# Patient Record
Sex: MALE | Race: WHITE | URBAN - METROPOLITAN AREA
[De-identification: names, ages, dates, MRNs, and addresses within clinical notes are randomized per-mention and may not be internally consistent; named-entity substitution may affect disease eponyms.]

---

## 2017-08-12 ENCOUNTER — EMERGENCY (EMERGENCY)
Facility: HOSPITAL | Age: 52
LOS: 1 days | Discharge: HOME | End: 2017-08-12
Attending: EMERGENCY MEDICINE
Payer: MEDICAID

## 2017-08-12 VITALS
SYSTOLIC BLOOD PRESSURE: 135 MMHG | OXYGEN SATURATION: 97 % | RESPIRATION RATE: 16 BRPM | TEMPERATURE: 99 F | DIASTOLIC BLOOD PRESSURE: 78 MMHG | HEART RATE: 96 BPM | HEIGHT: 72 IN | WEIGHT: 251.99 LBS

## 2017-08-12 DIAGNOSIS — Z90.49 ACQUIRED ABSENCE OF OTHER SPECIFIED PARTS OF DIGESTIVE TRACT: Chronic | ICD-10-CM

## 2017-08-12 DIAGNOSIS — Z98.890 OTHER SPECIFIED POSTPROCEDURAL STATES: Chronic | ICD-10-CM

## 2017-08-12 PROCEDURE — 12001 RPR S/N/AX/GEN/TRNK 2.5CM/<: CPT

## 2017-08-12 PROCEDURE — 99282 EMERGENCY DEPT VISIT SF MDM: CPT | Mod: 25

## 2017-08-12 NOTE — ED STATDOCS - OBJECTIVE STATEMENT
50 y/o M w/ PMHx of blind R eye presents to the ED c/o L pinky lacerations s/p cutting it with a razor today. Pt is R handed and he received his tetanus within the last 10 years. Pt denies LOC, HA or any other complaints at this time. This patient is a 52 y/o right hand dominant man who presents to the ER c/o laceration to left 5th digit.  Patient states that accidently cut the finger on a razor.   Tetanus: up to date

## 2017-08-12 NOTE — ED STATDOCS - PROGRESS NOTE DETAILS
patient re-evaluated c/o left 5th digit laceration with razor x 4 hours ago, was searching in tool box when razor lacerated skin.  He reports is UTD with tetanus.  PE: +FROM, motor and sensory sensation intact, strength 5/5, adduction and abduction intact, cap refill < 2sec, +1cm laceration along mid phalaynx dorsal aspect, no visible tendon, radial pulse intact.  Plan irrigate, suture and have f/u with PCP for suture removal in 7-10 days.

## 2017-08-12 NOTE — ED ADULT NURSE NOTE - OBJECTIVE STATEMENT
Patient presents to ED with laceration to left pinky finger, bleeding controlled, denies any sort of medical hx, able to move extremity, VSS, A/Ox3, denies chest pain or sob.  Respirations are even and unlabored, lungs cta, +bowel x4 quads, abdomen soft, nontender/nondistended, skin w/d/i.

## 2017-08-12 NOTE — ED STATDOCS - ATTENDING CONTRIBUTION TO CARE
I, Jacqui Melendrez, performed the initial face to face bedside interview with this patient regarding history of present illness, review of symptoms and relevant past medical, social and family history.  I completed an independent physical examination.  I was the initial provider who evaluated this patient.  The history, relevant review of systems, past medical and surgical history, medical decision making, and physical examination was documented by the scribe in my presence and I attest to the accuracy of the documentation.  I have signed out the follow up of any pending tests (i.e. labs, radiological studies) to the ACP.  I have communicated the patient’s plan of care and disposition with the ACP.

## 2017-08-16 NOTE — ED PROCEDURE NOTE - NS ED PERI VASCULAR NEG
no cyanosis of extremity/fingers/toes warm to touch/capillary refill time < 2 seconds/no swelling/no paresthesia

## 2017-08-16 NOTE — ED PROCEDURE NOTE - CPROC ED POST PROC CARE GUIDE1
Keep the cast/splint/dressing clean and dry./Instructed patient/caregiver to follow-up with primary care physician./Verbal/written post procedure instructions were given to patient/caregiver./Elevate the injured extremity as instructed./Instructed patient/caregiver regarding signs and symptoms of infection.
Instructed patient/caregiver to follow-up with primary care physician./Instructed patient/caregiver regarding signs and symptoms of infection./Verbal/written post procedure instructions were given to patient/caregiver./Keep the cast/splint/dressing clean and dry.

## 2017-12-21 ENCOUNTER — EMERGENCY (EMERGENCY)
Facility: HOSPITAL | Age: 52
LOS: 1 days | Discharge: HOME | End: 2017-12-21
Attending: EMERGENCY MEDICINE
Payer: MEDICAID

## 2017-12-21 VITALS
RESPIRATION RATE: 16 BRPM | HEART RATE: 102 BPM | HEIGHT: 72 IN | WEIGHT: 251.11 LBS | DIASTOLIC BLOOD PRESSURE: 100 MMHG | SYSTOLIC BLOOD PRESSURE: 153 MMHG | OXYGEN SATURATION: 98 % | TEMPERATURE: 97 F

## 2017-12-21 DIAGNOSIS — Z90.49 ACQUIRED ABSENCE OF OTHER SPECIFIED PARTS OF DIGESTIVE TRACT: Chronic | ICD-10-CM

## 2017-12-21 DIAGNOSIS — Z98.890 OTHER SPECIFIED POSTPROCEDURAL STATES: Chronic | ICD-10-CM

## 2017-12-21 PROCEDURE — 10060 I&D ABSCESS SIMPLE/SINGLE: CPT | Mod: F7

## 2017-12-21 PROCEDURE — 99283 EMERGENCY DEPT VISIT LOW MDM: CPT | Mod: 25

## 2017-12-21 PROCEDURE — 10060 I&D ABSCESS SIMPLE/SINGLE: CPT

## 2017-12-21 NOTE — ED STATDOCS - ATTENDING CONTRIBUTION TO CARE
I, Anand Keenan, performed the initial face to face bedside interview with this patient regarding history of present illness, review of symptoms and relevant past medical, social and family history.  I completed an independent physical examination.  I was the initial provider who evaluated this patient. I have signed out the follow up of any pending tests (i.e. labs, radiological studies) to the ACP.  I have communicated the patient’s plan of care and disposition with the ACP.

## 2017-12-21 NOTE — ED ADULT TRIAGE NOTE - CHIEF COMPLAINT QUOTE
pt presents to ED with b/l hand tremors and swelling/pain/redness to right thrid digit./ pt states he bit his cuticle a few days and ago and tried to drain it yesterday, afebrile

## 2017-12-21 NOTE — ED STATDOCS - CARE PLAN
Principal Discharge DX:	Paronychia of finger of right hand  Instructions for follow-up, activity and diet:	warm soaks as discussed

## 2017-12-21 NOTE — ED STATDOCS - PROGRESS NOTE DETAILS
Care assumed from Dr. Keenan. Patient is pending I&D of paronychia. I &D performed and Pt tolerated well. Pt states that he is borderline DM . Pt states that he usually bites his finger. Augment sent to pharmacy x 5 days. F/U with PCP as discussed.

## 2017-12-21 NOTE — ED STATDOCS - SKIN, MLM
dorsal right medial right finger paranoica dorsal right hand medial right finger paranoica dorsal right medial 3rd finger paronichia

## 2017-12-21 NOTE — ED STATDOCS - MEDICAL DECISION MAKING DETAILS
Michele - Will send to [facility] for I&D. Will instruct to do warm soaks. Michele [facility] and instruct pt to do warm soaks.

## 2017-12-21 NOTE — ED STATDOCS - TOBACCO USE
10/13/17        Toñito S Drew  [address] [facility] [address]    Dear Toñito,    Component      Latest Ref Rng & Units 10/13/2017   Albumin      3.6 - 5.1 g/dL 4.0   TOTAL BILIRUBIN      0.2 - 1.0 mg/dL 0.2   DIRECT BILIRUBIN      0.0 - 0.2 mg/dL 0.1   ALK PHOSPHATASE      45 - 117 Units/L 88   ALT/SGPT      <79 Units/L 31   AST/SGOT      <38 Units/L 20   TOTAL PROTEIN      6.4 - 8.2 g/dL 7.3   FASTING STATUS      hrs 12   CHOLESTEROL      <200 mg/dL 200 (H)   CALCULATED LDL      <130 mg/dL 131 (H)   HDL      >39 mg/dL 51   TRIGLYCERIDE      <150 mg/dL 88   CALCULATED NON HDL      mg/dL 149   CHOL/HDL      <4.5 3.9   Fasting Status      hrs 12   Glucose      65 - 99 mg/dL 107 (H)           Upon review of your test results done recently, please take note of the following recommendations in the boxes ( x ) below:      Recommendations:   Please call me at your earliest convenience to discuss these results.      Treatment is required.  Please call the clinic and set up an appointment with me as soon as possible.    The test results are satisfactory.    Continue efforts at a low cholesterol, low fat diet.    HDL is low: exercise, weight loss, and discontinuing smoking can help increase this.    LDL is high: can lower with diet, weight loss.    Triglycerides are high: can lower with diet, weight loss, and lower alcohol intake.    Diabetes care is in (    ) excellent control, (    ) good control, (   ) needs improvement.   x   The glucose test demonstrated a blood sugar of 107.  A normal fasting blood sugar should be less than 100.  This elevated blood sugar is not in the diabetic range at this time.  However, it is important for your overall health to control your blood sugar to avoid diabetes and it's complications.  Therefore, I would recommend following a mediterarrean diet and avoiding alcohol. I would recommend repeating the fasting blood sugar and Hemoglobin A1C in 6 months.  Please contact the  scheduling department at [phone] for assistance in scheduling your appointment(s).           Repeat (   ) fasting lipid panel, (   ) fasting diabetes test,  (   ) Pap smear,         (   ) ________________________, in (   ) weeks, (   ) months, (   ) as soon as possible.    Make an appointment for follow - up in (   ) weeks, (   ) months, (   ) yearly check-up.    Other recommendations:        Sincerely,                  Dr. Kevin Pena  [address] [address]  [phone]      Mediterranean Diet      This guide has been prepared for your use by registered dietitians. If you have questions or concerns, please call the nearest [facility] facility to contact a dietitian. Diet counseling is available to discuss your specific needs.    What is the Mediterranean Diet?    The Mediterranean region includes three continents and more than 15 countries. People from [place], [place], [place], [place] and other Mediterranean  countries traditionally eat a diet consisting mainly of grains, legumes, fruits, vegetables, nuts, seeds and olive oil.  Key components of the Mediterranean Diet include:  • Eating foods primarily from plant sources, such asfruits and vegetables, whole grains, legumes, nutsand seeds  • A variety of minimally processed and, wherever possible, seasonally fresh and locally grown foods  • Replacing butter and margarine with healthy fats,such as olive oil  • Using herbs and spices instead of salt to flavor foods  • Eating fish and poultry at least twice weekly  • Daily consumption of low to moderate amounts of cheese and yogurt  • No more than four eggs per week (including those used in cooking and baking)  • Fresh fruit as the typical daily dessert; sweets with a significant amount of sugar and saturated fat consumed no more than a few times a month  • Red meat is only eaten a few times per month in small portions  • Moderate consumption of wine, normally with  meals; about one to two glasses per day for men and one glass per day for women, however, individuals should only drink wine if they are medically able to do so, and should ask their doctors for more information.  • Sharing healthy meals as a celebration with family and friends  • Regular physical activity at a level that promotes a healthy weight, fitness and well-being    Why should I follow the Mediterranean Diet?    Researchers have found that a higher intake of monounsaturated and polyunsaturated fat and lower intake of saturated fat and trans-fatty acids is associated with a decreased risk of heart disease and cancer.  • Saturated fat tends to be hard fat, such as the fat in red meat, butter, cheese, whole milk and ice cream.  • Trans-fatty acids are formed when vegetable oils are hardened or hydrogenated to form margarine or shortening.  • Monounsaturated and polyunsaturated fats are liquid at room temperature and are found in plant sources. The fat in olive oil is monounsaturated.    What is the Mediterranean Diet Pyramid?    The Mediterranean Diet Pyramid emphasizes making the base foundation of your meals grains (mostly whole), fruits, vegetables, legumes, nuts, herbs, spices and olive oil. Fish, poultry, eggs, cheese and yogurt are consumed regularly but in low to moderate amounts. Red meat and sweets are listed at the top of the pyramid meaning they should be eaten only a few times a month. The Mediterranean Diet also recommends drinking adequate amounts of water each day and the use of red wine in moderation.    Is the Mediterranean Diet good for everyone?    The pyramid describes a diet for most healthy adults. If overweight or obese, focus should then be on eating more grains, fruits, vegetables and legumes, and less meat, sweets and total fat. Some type of physical activity, such as walking or stair climbing, should be included every day. [id number] (04/12) [facility] Nutrition > Special Diets  Mediterranean  Diet,    Tips for Following the Mediterranean Diet    Bread, potatoes, pasta, rice and other cereals  • Build your diet from a base of grains - rice, pasta, couscous  • Learn to cook couscous, bulgur, barley, polenta and other interesting grains  • Add legumes - chickpeas, lentils, peanuts, beans and peas - to your meals  • Choose whole-grain varieties most often for extra fiber.    Fruits and vegetables  • Choose a wide variety of fruits and vegetables, and aim for 5 to 9 cups daily  • Top pasta, rice or even pizza with steamed or fresh vegetables  • Add popular Mediterranean ingredients like garlic, fresh herbs, chopped onions, darion and grated lemon or orange zest to dishes  • Try baking garlic until it is soft and ambrose. It adds a creamy, rich flavor to pasta, rice and bean dishes.  • Make fresh fruit your daily dessert     Fish, poultry, eggs and meat  • Limit fatty meat - it is high in saturated fat  • Choose lower fat alternatives such as poultry, fish or lean red meat  • Eat low to moderate amounts of fish, poultry and eggs a few times a week  • Eat fish, including oily fish such as mackerel, herring, tuna and salmon at least three times a week  • Limit red meat to only a few times per month; slice it in stir-fried dishes so it goes a long way    Milk and dairy products  • Eat low to moderate amounts of cheese and yogurt every day  • Choose lower fat varieties such as low fat yogurt and skim milk  • Use a sprinkling or thin shaving of sharp or imported cheese rather than eating more of less flavorful cheese    Nuts and seeds  • Choose a variety of nuts and seeds - almonds, hazelnuts, pistachios, walnuts and sesame seeds (1 ounce daily)  • Add a small amount of chopped nuts to salads, pasta or other grain dishes for a crunchy texture    Fats  • Substitute olive oil, rich in monounsaturated fat, for butter and other animal fats, which are high in saturated fats (3 tablespoons of olive oil mixed in food  daily).  • Use olive oil for salad dressings, marinades, sautéing and stir-frying meats, fish and vegetables  • Limit foods that are fried to only a few times a month  • Drizzle small amounts of olive oil on bread, rice and pastas    Sweets  • Eat only small amounts of foods that contain large amounts of sugar and saturated fat, such as cakes and pastries; limit these foods to only a few times a month    Wine  • Drink red wine in moderation - one to two 5-ounce glasses per day for men, one 5-ounce glass for women  • Avoid wine if it would put you or others at risk, including during pregnancy and before driving    Recommended Web sites for Mediterranean Recipes; Allrecipes.com, www.epicurious.com (Search for “Mediterranean” on each site)  A registered dietitian can help, Diet counseling with a registered dietitian may include information on:  • Label reading, shopping, food preparation, adjusting recipes  • Dining out  Tips for Following the Mediterranean Diet  For a list of [place] facilities with a dietitian, please call [facility] toll free at [phone] [phone] (04/12) [facility] Nutrition > Special Diets  The information presented is intended for general information and educational purposes. It is not intended to replace the advice of your health care provider. Contact your health care provider if you believe you have a health problem.  [facility] is a not-for-profit health care provider and a national leader in efforts to improve the quality of health care.                            Unknown if ever smoked

## 2017-12-21 NOTE — ED STATDOCS - NS ED ROS FT
Pt denies headache.  Pt denies throat pain.  Pt denies earache.  Pt denies neck pain.  Pt denies chest pain.  Pt denies abdominal pain.   Pt denies back pain.   Pt denies joint pain.  Pt denies muscle aches.   Pt denies any rash, fever, or chills. Pt denies headache.  Pt denies throat pain.  Pt denies earache.  Pt denies neck pain.  Pt denies chest pain.  Pt denies abdominal pain.   Pt denies back pain.   Pt denies any fever, or chills.

## 2017-12-21 NOTE — ED STATDOCS - OBJECTIVE STATEMENT
51 y/o M present to the ED c/o right 3rd digit pain. Pt states pain was relieved when he dipped his finger in hot water. He states he does not bite his nails and he never had this problem before. NKDA. No further complaints at this time. 51 y/o M present to the ED c/o right 3rd digit pain. Pt states pain was relieved when he dipped his finger in hot water. He states he bites his nails and he never had this problem before. NKDA. No further complaints at this time.

## 2017-12-21 NOTE — ED ADULT NURSE NOTE - OBJECTIVE STATEMENT
pt arrived with swelling to right third finger, pt denies any injury pt with reddness and swelling good pulses

## 2018-01-16 NOTE — ED PROCEDURE NOTE - ATTENDING CONTRIBUTION TO CARE
I, Faith Contreras, independently evaluated the patient and discussed the procedure with the PA. I evaluted the patient prior to and after the procedure and the patient tolerated the procedure well. I discussed indications to return to the ED and the importance of proper follow up and patient verbalizes understanding.

## 2021-05-26 ENCOUNTER — APPOINTMENT (OUTPATIENT)
Dept: PULMONOLOGY | Facility: CLINIC | Age: 56
End: 2021-05-26
Payer: COMMERCIAL

## 2021-05-26 ENCOUNTER — APPOINTMENT (OUTPATIENT)
Dept: DISASTER EMERGENCY | Facility: OTHER | Age: 56
End: 2021-05-26
Payer: COMMERCIAL

## 2021-05-26 VITALS
WEIGHT: 264 LBS | SYSTOLIC BLOOD PRESSURE: 124 MMHG | HEART RATE: 78 BPM | OXYGEN SATURATION: 97 % | TEMPERATURE: 97 F | RESPIRATION RATE: 16 BRPM | DIASTOLIC BLOOD PRESSURE: 80 MMHG

## 2021-05-26 DIAGNOSIS — G47.33 OBSTRUCTIVE SLEEP APNEA (ADULT) (PEDIATRIC): ICD-10-CM

## 2021-05-26 DIAGNOSIS — E66.9 OBESITY, UNSPECIFIED: ICD-10-CM

## 2021-05-26 DIAGNOSIS — Z86.39 PERSONAL HISTORY OF OTHER ENDOCRINE, NUTRITIONAL AND METABOLIC DISEASE: ICD-10-CM

## 2021-05-26 PROBLEM — H54.41 BLINDNESS, RIGHT EYE, NORMAL VISION LEFT EYE: Chronic | Status: ACTIVE | Noted: 2017-08-12

## 2021-05-26 PROBLEM — Z00.00 ENCOUNTER FOR PREVENTIVE HEALTH EXAMINATION: Noted: 2021-05-26

## 2021-05-26 PROCEDURE — 99204 OFFICE O/P NEW MOD 45 MIN: CPT

## 2021-05-26 PROCEDURE — 99072 ADDL SUPL MATRL&STAF TM PHE: CPT

## 2021-05-26 PROCEDURE — 0011A: CPT

## 2021-05-26 RX ORDER — SIMVASTATIN 20 MG/1
20 TABLET, FILM COATED ORAL
Refills: 0 | Status: ACTIVE | COMMUNITY

## 2021-05-26 RX ORDER — METFORMIN HYDROCHLORIDE 500 MG/1
500 TABLET, FILM COATED, EXTENDED RELEASE ORAL
Refills: 0 | Status: ACTIVE | COMMUNITY

## 2021-05-26 NOTE — PHYSICAL EXAM
[Low Lying Soft Palate] : low lying soft palate [Enlarged Base of the Tongue] : enlarged base of the tongue [III] : Mallampati Class: III [Neck Circumference: ___] : neck circumference: [unfilled] [Normal Rate/Rhythm] : normal rate/rhythm [No Resp Distress] : no resp distress [Clear to Auscultation Bilaterally] : clear to auscultation bilaterally [No Abnormalities] : no abnormalities [Benign] : benign [Normal Gait] : normal gait [No Clubbing] : no clubbing [No Cyanosis] : no cyanosis [No Edema] : no edema [FROM] : FROM [Normal Color/ Pigmentation] : normal color/ pigmentation [No Focal Deficits] : no focal deficits [Oriented x3] : oriented x3 [Normal Affect] : normal affect [TextBox_2] : obese [TextBox_11] : Patch over Rt Eye

## 2021-05-26 NOTE — ASSESSMENT
[FreeTextEntry1] : Patient likely with significant obstructive sleep apnea.I discussed the pathophysiology of Obstructive Sleep Apnea with the patient, and its association with the patient's symptoms of sleepiness and comorbidities of diabetes.  A home sleep study has been ordered.  The nature of that study was explained to the patient.  Followup will occur after the sleep study.\par The patient was warned not to drive while somnolent.

## 2021-05-26 NOTE — HISTORY OF PRESENT ILLNESS
[TextBox_4] : The patient is a 55-year-old male who comes for sleep evaluation. About 15 years ago he had a sleep study that showed sleep apnea. He was given CPAP he used it for a while. He had some trouble getting used to it. He lost about 30 pounds and felt better and stopped using CPAP. More recently he's gained weight again and he is complaining of significant excessive daytime sleepiness. Loud snoring and apneas are noted by his family. Generally he gets into bed at 1 AM and falls asleep within a half-hour. His sleep is restless with tossing and turning and he wakes up at 7 AM feeling unrefreshed. 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 He has not had any accidents. [ESS] : 11

## 2021-06-25 ENCOUNTER — APPOINTMENT (OUTPATIENT)
Dept: DISASTER EMERGENCY | Facility: OTHER | Age: 56
End: 2021-06-25
Payer: COMMERCIAL

## 2021-06-25 PROCEDURE — 0012A: CPT

## 2021-11-19 ENCOUNTER — EMERGENCY (EMERGENCY)
Facility: HOSPITAL | Age: 56
LOS: 0 days | Discharge: HOME | End: 2021-11-19
Attending: EMERGENCY MEDICINE
Payer: COMMERCIAL

## 2021-11-19 VITALS
OXYGEN SATURATION: 98 % | RESPIRATION RATE: 18 BRPM | HEART RATE: 98 BPM | WEIGHT: 197.98 LBS | SYSTOLIC BLOOD PRESSURE: 129 MMHG | DIASTOLIC BLOOD PRESSURE: 86 MMHG | TEMPERATURE: 98 F | HEIGHT: 73 IN

## 2021-11-19 DIAGNOSIS — Z72.89 OTHER PROBLEMS RELATED TO LIFESTYLE: ICD-10-CM

## 2021-11-19 DIAGNOSIS — Z90.49 ACQUIRED ABSENCE OF OTHER SPECIFIED PARTS OF DIGESTIVE TRACT: Chronic | ICD-10-CM

## 2021-11-19 DIAGNOSIS — Z98.890 OTHER SPECIFIED POSTPROCEDURAL STATES: Chronic | ICD-10-CM

## 2021-11-19 PROCEDURE — 99285 EMERGENCY DEPT VISIT HI MDM: CPT

## 2021-11-19 PROCEDURE — 99284 EMERGENCY DEPT VISIT MOD MDM: CPT

## 2021-11-19 NOTE — ED PROVIDER NOTE - CLINICAL SUMMARY MEDICAL DECISION MAKING FREE TEXT BOX
pt with alcohol use tonight while with friends.   A&Ox3.   calling friend to .   ambulating throughout ED with no issues.   will d/c

## 2021-11-19 NOTE — ED PROVIDER NOTE - OBJECTIVE STATEMENT
57 y/o male in ED BIB [facility] for alcohol use tonight.   pt states that he was out drinking with friends tonight and was on his way home when he was pulled over by [facility].   pt states [facility] called EMS to bring him to the ED.   pt denies any complaints.   states feels fine.

## 2021-11-19 NOTE — ED ADULT TRIAGE NOTE - CHIEF COMPLAINT QUOTE
Pt brought in by ambulance for intoxication. EMS states that they were called by [facility] to transport the patient. Pt admits to drinking this evening with friends that he has not seen in a long time. Pt with no complaints.

## 2021-11-19 NOTE — ED ADULT NURSE NOTE - NSIMPLEMENTINTERV_GEN_ALL_ED
Implemented All Fall Risk Interventions:  [place] to call system. Call bell, personal items and telephone within reach. Instruct patient to call for assistance. Room bathroom lighting operational. Non-slip footwear when patient is off stretcher. Physically safe environment: no spills, clutter or unnecessary equipment. Stretcher in lowest position, wheels locked, appropriate side rails in place. Provide visual cue, wrist band, yellow gown, etc. Monitor gait and stability. Monitor for mental status changes and reorient to person, place, and time. Review medications for side effects contributing to fall risk. Reinforce activity limits and safety measures with patient and family.

## 2021-11-19 NOTE — ED ADULT NURSE NOTE - OBJECTIVE STATEMENT
Patient B[facility] for alcohol intoxication. Patient in no acute distress at this time. Patient denies any chest pain or SOB. Patient changed into yellow gown.

## 2021-11-19 NOTE — ED PROVIDER NOTE - PATIENT PORTAL LINK FT
You can access the FollowMyHealth Patient Portal offered by [facility] by registering at the following website: [email or web address]. By joining [employer]’s FollowMyHealth portal, you will also be able to view your health information using other applications (apps) compatible with our system.

## 2022-05-13 PROBLEM — Z78.9 OTHER SPECIFIED HEALTH STATUS: Chronic | Status: ACTIVE | Noted: 2021-11-19

## 2022-06-09 ENCOUNTER — APPOINTMENT (OUTPATIENT)
Dept: PULMONOLOGY | Facility: CLINIC | Age: 57
End: 2022-06-09

## 2022-06-14 ENCOUNTER — APPOINTMENT (OUTPATIENT)
Dept: PULMONOLOGY | Facility: CLINIC | Age: 57
End: 2022-06-14

## 2022-07-11 NOTE — ED ADULT TRIAGE NOTE - BEFAST SPEECH SLURRED
Chart reviewed. Rx was sent with refills on 02/11/22  
Please advise, ok to refill?  Last office visit 6/9/22  Patient has a follow up on none  
No

## 2024-07-31 ENCOUNTER — OFFICE (OUTPATIENT)
Dept: URBAN - METROPOLITAN AREA CLINIC 115 | Facility: CLINIC | Age: 59
Setting detail: OPHTHALMOLOGY
End: 2024-07-31
Payer: MEDICAID

## 2024-07-31 DIAGNOSIS — E11.9: ICD-10-CM

## 2024-07-31 DIAGNOSIS — H52.4: ICD-10-CM

## 2024-07-31 DIAGNOSIS — H25.12: ICD-10-CM

## 2024-07-31 DIAGNOSIS — H35.40: ICD-10-CM
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
## 2024-07-31 DIAGNOSIS — H11.152: ICD-10-CM

## 2024-07-31 DIAGNOSIS — H04.122: ICD-10-CM

## 2024-07-31 DIAGNOSIS — H11.32: ICD-10-CM

## 2024-07-31 PROCEDURE — 92015 DETERMINE REFRACTIVE STATE: CPT | Performed by: OPHTHALMOLOGY

## 2024-07-31 PROCEDURE — 92014 COMPRE OPH EXAM EST PT 1/>: CPT | Performed by: OPHTHALMOLOGY

## 2024-07-31 ASSESSMENT — CONFRONTATIONAL VISUAL FIELD TEST (CVF)
OS_FINDINGS: FULL
OD_FINDINGS: FULL

## 2025-07-16 ENCOUNTER — NON-APPOINTMENT (OUTPATIENT)
Age: 60
End: 2025-07-16

## 2025-07-16 ENCOUNTER — APPOINTMENT (OUTPATIENT)
Dept: GASTROENTEROLOGY | Facility: CLINIC | Age: 60
End: 2025-07-16
Payer: COMMERCIAL

## 2025-07-16 VITALS
RESPIRATION RATE: 14 BRPM | DIASTOLIC BLOOD PRESSURE: 80 MMHG | WEIGHT: 253 LBS | SYSTOLIC BLOOD PRESSURE: 139 MMHG | BODY MASS INDEX: 33.53 KG/M2 | TEMPERATURE: 97.5 F | HEART RATE: 71 BPM | OXYGEN SATURATION: 98 % | HEIGHT: 73 IN

## 2025-07-16 PROBLEM — Z12.11 COLON CANCER SCREENING: Status: ACTIVE | Noted: 2025-07-16

## 2025-07-16 PROBLEM — E11.9 DIABETES MELLITUS TYPE 2, CONTROLLED: Status: ACTIVE | Noted: 2025-07-16

## 2025-07-16 PROCEDURE — 99204 OFFICE O/P NEW MOD 45 MIN: CPT

## 2025-07-16 RX ORDER — POLYETHYLENE GLYCOL 3350, SODIUM SULFATE, POTASSIUM CHLORIDE, MAGNESIUM SULFATE, AND SODIUM CHLORIDE FOR ORAL SOLUTION 178.7-7.3G
178.7 KIT ORAL
Qty: 1 | Refills: 0 | Status: ACTIVE | COMMUNITY
Start: 2025-07-16 | End: 1900-01-01

## 2025-07-17 PROBLEM — Z86.39 HISTORY OF HYPERLIPIDEMIA: Status: RESOLVED | Noted: 2021-05-26 | Resolved: 2025-07-17

## 2025-07-17 PROBLEM — Z86.39 HISTORY OF TYPE 2 DIABETES MELLITUS: Status: RESOLVED | Noted: 2021-05-26 | Resolved: 2025-07-17

## 2025-07-17 PROBLEM — E66.812 OBESITY, CLASS II, BMI 35-39.9: Status: ACTIVE | Noted: 2021-05-26

## 2025-07-17 PROBLEM — E78.2 HYPERLIPIDEMIA, MIXED: Status: ACTIVE | Noted: 2025-07-17

## 2025-07-31 ENCOUNTER — OFFICE (OUTPATIENT)
Dept: URBAN - METROPOLITAN AREA CLINIC 115 | Facility: CLINIC | Age: 60
Setting detail: OPHTHALMOLOGY
End: 2025-07-31
Payer: COMMERCIAL

## 2025-07-31 DIAGNOSIS — H43.392: ICD-10-CM

## 2025-07-31 DIAGNOSIS — H25.12: ICD-10-CM

## 2025-07-31 DIAGNOSIS — H11.152: ICD-10-CM

## 2025-07-31 DIAGNOSIS — H04.122: ICD-10-CM
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
## 2025-07-31 PROCEDURE — 92014 COMPRE OPH EXAM EST PT 1/>: CPT | Performed by: OPHTHALMOLOGY

## 2025-07-31 PROCEDURE — 92250 FUNDUS PHOTOGRAPHY W/I&R: CPT | Performed by: OPHTHALMOLOGY

## 2025-07-31 ASSESSMENT — REFRACTION_MANIFEST
OS_SPHERE: +1.00
OD_ADD: +1.75
OS_ADD: +1.75
OS_CYLINDER: -0.50
OD_SPHERE: PL
OS_AXIS: 135

## 2025-07-31 ASSESSMENT — VISUAL ACUITY
OD_BCVA: 20/25
OS_BCVA: PROSTHESIS

## 2025-07-31 ASSESSMENT — CONFRONTATIONAL VISUAL FIELD TEST (CVF)
OD_FINDINGS: FULL
OS_FINDINGS: FULL

## 2025-07-31 ASSESSMENT — TONOMETRY: OS_IOP_MMHG: 10

## 2025-08-06 ENCOUNTER — OFFICE (OUTPATIENT)
Dept: URBAN - METROPOLITAN AREA CLINIC 104 | Facility: CLINIC | Age: 60
Setting detail: OPHTHALMOLOGY
End: 2025-08-06
Payer: COMMERCIAL

## 2025-08-06 ENCOUNTER — APPOINTMENT (OUTPATIENT)
Dept: NEUROLOGY | Facility: CLINIC | Age: 60
End: 2025-08-06
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
## 2025-08-06 DIAGNOSIS — H04.122: ICD-10-CM

## 2025-08-06 PROCEDURE — 99213 OFFICE O/P EST LOW 20 MIN: CPT | Performed by: OPHTHALMOLOGY

## 2025-08-06 ASSESSMENT — VISUAL ACUITY
OS_BCVA: PROSTHESIS
OD_BCVA: 20/25

## 2025-08-06 ASSESSMENT — KERATOMETRY
OS_AXISANGLE_DEGREES: 067
OS_K1POWER_DIOPTERS: 42.50
OS_K2POWER_DIOPTERS: 43.25

## 2025-08-06 ASSESSMENT — CONFRONTATIONAL VISUAL FIELD TEST (CVF)
OD_FINDINGS: FULL
OS_FINDINGS: FULL

## 2025-08-06 ASSESSMENT — REFRACTION_AUTOREFRACTION
OS_CYLINDER: -1.00
OS_SPHERE: +1.50
OS_AXIS: 034

## 2025-08-06 ASSESSMENT — SUPERFICIAL PUNCTATE KERATITIS (SPK): OS_SPK: 1+

## 2025-08-18 ENCOUNTER — NON-APPOINTMENT (OUTPATIENT)
Age: 60
End: 2025-08-18

## 2025-08-18 ENCOUNTER — APPOINTMENT (OUTPATIENT)
Dept: NEUROLOGY | Facility: CLINIC | Age: 60
End: 2025-08-18
Payer: COMMERCIAL

## 2025-08-18 VITALS
SYSTOLIC BLOOD PRESSURE: 126 MMHG | HEIGHT: 73 IN | OXYGEN SATURATION: 98 % | WEIGHT: 255 LBS | BODY MASS INDEX: 33.8 KG/M2 | HEART RATE: 90 BPM | DIASTOLIC BLOOD PRESSURE: 68 MMHG

## 2025-08-18 DIAGNOSIS — Z78.9 OTHER SPECIFIED HEALTH STATUS: ICD-10-CM

## 2025-08-18 DIAGNOSIS — R42 DIZZINESS AND GIDDINESS: ICD-10-CM

## 2025-08-18 DIAGNOSIS — Z87.891 PERSONAL HISTORY OF NICOTINE DEPENDENCE: ICD-10-CM

## 2025-08-18 DIAGNOSIS — H93.A1 PULSATILE TINNITUS, RIGHT EAR: ICD-10-CM

## 2025-08-18 PROCEDURE — G2211 COMPLEX E/M VISIT ADD ON: CPT

## 2025-08-18 PROCEDURE — 99205 OFFICE O/P NEW HI 60 MIN: CPT

## 2025-08-18 RX ORDER — LOSARTAN POTASSIUM 100 MG/1
TABLET, FILM COATED ORAL
Refills: 0 | Status: ACTIVE | COMMUNITY

## 2025-08-18 RX ORDER — MULTIVIT-MIN/IRON/FOLIC ACID/K 18-600-40
CAPSULE ORAL
Refills: 0 | Status: ACTIVE | COMMUNITY